# Patient Record
Sex: FEMALE | Race: WHITE | Employment: OTHER | URBAN - METROPOLITAN AREA
[De-identification: names, ages, dates, MRNs, and addresses within clinical notes are randomized per-mention and may not be internally consistent; named-entity substitution may affect disease eponyms.]

---

## 2019-04-12 ENCOUNTER — HOSPITAL ENCOUNTER (OUTPATIENT)
Dept: MAMMOGRAPHY | Age: 64
Discharge: HOME | End: 2019-04-12
Attending: FAMILY MEDICINE
Payer: COMMERCIAL

## 2019-04-12 DIAGNOSIS — Z12.31 VISIT FOR SCREENING MAMMOGRAM: ICD-10-CM

## 2019-04-12 PROCEDURE — 77063 BREAST TOMOSYNTHESIS BI: CPT

## 2020-01-02 ENCOUNTER — OFFICE VISIT (OUTPATIENT)
Dept: CARDIOLOGY CLINIC | Age: 65
End: 2020-01-02

## 2020-01-02 VITALS
OXYGEN SATURATION: 98 % | WEIGHT: 169 LBS | HEART RATE: 65 BPM | DIASTOLIC BLOOD PRESSURE: 102 MMHG | HEIGHT: 67 IN | SYSTOLIC BLOOD PRESSURE: 198 MMHG | BODY MASS INDEX: 26.53 KG/M2

## 2020-01-02 DIAGNOSIS — Z01.810 PREOP CARDIOVASCULAR EXAM: ICD-10-CM

## 2020-01-02 DIAGNOSIS — E03.9 HYPOTHYROIDISM, UNSPECIFIED TYPE: ICD-10-CM

## 2020-01-02 DIAGNOSIS — I10 ESSENTIAL HYPERTENSION: ICD-10-CM

## 2020-01-02 DIAGNOSIS — R94.31 ABNORMAL EKG: Primary | ICD-10-CM

## 2020-01-02 DIAGNOSIS — S62.109K: ICD-10-CM

## 2020-01-02 RX ORDER — ATORVASTATIN CALCIUM 20 MG/1
TABLET, FILM COATED ORAL DAILY
COMMUNITY

## 2020-01-02 RX ORDER — LEVOTHYROXINE SODIUM 88 UG/1
88 TABLET ORAL
COMMUNITY

## 2020-01-02 NOTE — PROGRESS NOTES
HPI: This is a 65-year old female here for a preoperative evaluation for right wrist surgery by Dr. Nicole Bonilla at [facility] scheduled for 1/3/20. In preoperative evaluation, she was found to have an abnormal EKG. She had a stress test a few years ago at [facility] which was unremarkable. She denies absolutely no chest pain, dyspnea, PND, orthopnea, or edema. She was told last year that she may have some early prehypertension but has not required any medication. She does take a statin. She has a family history of coronary artery disease. She also has a history of a thyroid disorder which has been stable. She does not use excessive salt. Impression/Plan:  1. Preoperative cardiovascular exam for right wrist surgery by Dr. Nicole Bonilla at [facility]. I see no restrictions in proceeding. She has excellent functional status, able to climb a couple of flights of stairs without limitations. 2. History of cardiac stress test 2016, unremarkable with normal EF. 3. Abnormal EKG with interventricular conduction delay, new from a few years ago. However, no symptoms of conduction disease or angina. 4. Dyslipidemia on statin. 5. Thyroid disorder on replacement. 6. Family history of coronary artery disease. 7. Suspected hypertension although likely worse in the setting of pain. It is reasonable to proceed with surgery without any further testing. Her stress test a few years ago was normal with no ischemia and normal left ventricular function. She is able to exercise without limitations and given the significant pain, her blood pressure is likely elevated. I have asked her to monitor her blood pressure post procedure and if it remains elevated, let myself or Dr. Rubina Montgomery know so she can be started on appropriate antihypertensives if needed. She does have a blood pressure cuff at home.  I also recommended future long-term echocardiograms since it does appear she has an element of hypertension to make sure she is not having any significant LVH. All questions answered. Current Outpatient Medications   Medication Sig Dispense Refill    atorvastatin (LIPITOR) 20 mg tablet Take  by mouth daily.  levothyroxine (SYNTHROID) 88 mcg tablet Take 88 mcg by mouth Daily (before breakfast). Social History   has no history on file for tobacco.   has no history on file for alcohol. Family History  family history is not on file. Review of Systems  Except as stated above include:  Constitutional: Negative for fever, chills and malaise/fatigue. HEENT: No congestion or recent URI. Gastrointestinal: No nausea, vomiting, abdominal pain, bloody stools. Pulmonary:  Negative except as stated above. Cardiac:  Negative except as stated above. Musculoskeletal: Negative except as stated above. Neurological:  No localized symptoms. Skin:  Negative except as stated above. Psych:  Negative except as stated above. Endocrine:  Negative except as stated above. PHYSICAL EXAM  BP Readings from Last 3 Encounters:   01/02/20 (!) 198/102     Pulse Readings from Last 3 Encounters:   01/02/20 65     Wt Readings from Last 3 Encounters:   01/02/20 76.7 kg (169 lb)     General:   Well developed, well groomed. Head/Neck:   No jugular venous distention     No carotid bruits. No evidence of xanthelasma. Lungs:   No respiratory distress. Clear bilaterally. Heart:    Regular rate and rhythm. Normal S1/S2. Palpation of heart with normal point of maximum impulse. No significant murmurs, rubs or gallops. Abdomen:   Soft and nontender. Extremities:   Intact peripheral pulses. Right arm in cast.  No LE edema. Neurological:   Alert and oriented to person, place, time. No focal neurological deficit visually. Skin:   No obvious rash    Blood Pressure Metric:  Monitor recommended and adjustments stated if needed.

## 2020-01-02 NOTE — LETTER
1/2/2020 2:10 PM 
 
Ms. Sung Hernandez [address] [address] Sung Hernandez was seen in our office on 1/2/2020 for cardiac evaluation. From a cardiac standpoint she cleared for wrist surgery with Dr. Rose Marie Anderson. Please feel free to contact our office if you have any questions regarding this patient. Sincerely,       Jerardo Hargrove MD

## 2020-01-02 NOTE — PROGRESS NOTES
Gretchen Trujillo presents today for   Chief Complaint   Patient presents with    New Patient     self referred for abn ekg - having wrist surgery w/ Dr. Sharonda Pink at [address] preferred language for health care discussion is english/other. Is someone accompanying this pt?      Is the patient using any DME equipment during [address]? no    Depression Screening:  3 most recent PHQ Screens 1/2/2020   Little interest or pleasure in doing things Not at all   Feeling down, depressed, irritable, or hopeless Not at all   Total Score PHQ 2 0       Learning Assessment:  Learning Assessment 1/2/2020   PRIMARY LEARNER Patient   PRIMARY LANGUAGE ENGLISH   LEARNER PREFERENCE PRIMARY DEMONSTRATION   ANSWERED BY Patient   RELATIONSHIP SELF       Abuse Screening:  Abuse Screening Questionnaire 1/2/2020   Do you ever feel afraid of your partner? N   Are you in a relationship with someone who physically or mentally threatens you? N   Is it safe for you to go home? Y       Fall Risk  Fall Risk Assessment, last 12 mths 1/2/2020   Able to walk? Yes   Fall in past 12 months? Yes   Fall with injury? Yes   Number of falls in past 12 months 1   Fall Risk Score 2       Pt currently taking Anticoagulant therapy? no    Coordination of Care:  1. Have you been to the ER, urgent care clinic since your last visit? Hospitalized since your last visit? no    2. Have you seen or consulted any other health care providers outside of the [address] [place] [address] since your last visit? Include any pap smears or colon screening.  no

## 2020-01-02 NOTE — PATIENT INSTRUCTIONS
If you have not heard from the central scheduler to schedule your testing in 48 hours, please call [phone]. PLEASE DO NOT DELAY SURGERY, ECHO MAY BE SCHEDULED FOR A DATE AFTER SURGERY.

## 2020-02-17 ENCOUNTER — HOSPITAL ENCOUNTER (OUTPATIENT)
Dept: BONE DENSITY | Age: 65
Discharge: HOME | End: 2020-02-17
Attending: FAMILY MEDICINE
Payer: COMMERCIAL

## 2020-02-17 DIAGNOSIS — M85.80 OSTEOPENIA: ICD-10-CM

## 2020-02-17 PROCEDURE — 77080 DXA BONE DENSITY AXIAL: CPT

## 2020-05-28 ENCOUNTER — HOSPITAL ENCOUNTER (OUTPATIENT)
Dept: NON INVASIVE DIAGNOSTICS | Age: 65
Discharge: HOME | End: 2020-05-28
Attending: INTERNAL MEDICINE
Payer: COMMERCIAL

## 2020-05-28 VITALS
HEIGHT: 67 IN | WEIGHT: 169 LBS | BODY MASS INDEX: 26.53 KG/M2 | DIASTOLIC BLOOD PRESSURE: 90 MMHG | SYSTOLIC BLOOD PRESSURE: 138 MMHG

## 2020-05-28 DIAGNOSIS — R94.31 ABNORMAL EKG: ICD-10-CM

## 2020-05-28 LAB
ECHO AO ROOT DIAM: 3.06 CM
ECHO LA AREA 4C: 16.6 CM2
ECHO LA VOL 2C: 36.48 ML (ref 22–52)
ECHO LA VOL 4C: 42.56 ML (ref 22–52)
ECHO LA VOL BP: 44.94 ML (ref 22–52)
ECHO LA VOL/BSA BIPLANE: 23.87 ML/M2 (ref 16–28)
ECHO LA VOLUME INDEX A2C: 19.38 ML/M2 (ref 16–28)
ECHO LA VOLUME INDEX A4C: 22.61 ML/M2 (ref 16–28)
ECHO LV E' LATERAL VELOCITY: 6.54 CM/S
ECHO LV E' SEPTAL VELOCITY: 6.18 CM/S
ECHO LV EDV TEICHHOLZ: 0.64 ML
ECHO LV ESV TEICHHOLZ: 0.24 ML
ECHO LV INTERNAL DIMENSION DIASTOLIC: 4.83 CM (ref 3.9–5.3)
ECHO LV INTERNAL DIMENSION SYSTOLIC: 3.2 CM
ECHO LV IVSD: 1.1 CM (ref 0.6–0.9)
ECHO LV MASS 2D: 203.9 G (ref 67–162)
ECHO LV MASS INDEX 2D: 108.3 G/M2 (ref 43–95)
ECHO LV POSTERIOR WALL DIASTOLIC: 0.93 CM (ref 0.6–0.9)
ECHO LVOT DIAM: 2.05 CM
ECHO LVOT PEAK GRADIENT: 5.4 MMHG
ECHO LVOT PEAK VELOCITY: 115.97 CM/S
ECHO LVOT VTI: 27.87 CM
ECHO MV A VELOCITY: 71.31 CM/S
ECHO MV E DECELERATION TIME (DT): 205.8 MS
ECHO MV E VELOCITY: 61.65 CM/S
ECHO MV E/A RATIO: 0.86
ECHO MV E/E' LATERAL: 9.43
ECHO MV E/E' RATIO (AVERAGED): 9.7
ECHO MV E/E' SEPTAL: 9.98
ECHO RV TAPSE: 2.51 CM (ref 1.5–2)
ECHO TV REGURGITANT MAX VELOCITY: 243.22 CM/S
ECHO TV REGURGITANT PEAK GRADIENT: 23.7 MMHG
LVFS 2D: 33.78 %
LVOT MG: 2.24 MMHG
LVOT MV: 0.67 CM/S
LVSV (TEICH): 35.78 ML
MV DEC SLOPE: 3

## 2020-05-28 PROCEDURE — 93306 TTE W/DOPPLER COMPLETE: CPT

## 2020-11-25 ENCOUNTER — OFFICE VISIT (OUTPATIENT)
Dept: CARDIOLOGY CLINIC | Age: 65
End: 2020-11-25
Payer: COMMERCIAL

## 2020-11-25 VITALS
DIASTOLIC BLOOD PRESSURE: 98 MMHG | SYSTOLIC BLOOD PRESSURE: 168 MMHG | HEART RATE: 67 BPM | BODY MASS INDEX: 25.9 KG/M2 | WEIGHT: 165 LBS | HEIGHT: 67 IN | OXYGEN SATURATION: 100 %

## 2020-11-25 DIAGNOSIS — E03.9 HYPOTHYROIDISM, UNSPECIFIED TYPE: ICD-10-CM

## 2020-11-25 DIAGNOSIS — I10 ESSENTIAL HYPERTENSION: ICD-10-CM

## 2020-11-25 DIAGNOSIS — R94.31 ABNORMAL EKG: Primary | ICD-10-CM

## 2020-11-25 PROCEDURE — 93000 ELECTROCARDIOGRAM COMPLETE: CPT | Performed by: INTERNAL MEDICINE

## 2020-11-25 PROCEDURE — 99215 OFFICE O/P EST HI 40 MIN: CPT | Performed by: INTERNAL MEDICINE

## 2020-11-25 RX ORDER — AMLODIPINE BESYLATE 5 MG/1
5 TABLET ORAL DAILY
Qty: 90 TAB | Refills: 3 | Status: SHIPPED | OUTPATIENT
Start: 2020-11-25 | End: 2022-01-24

## 2020-11-25 RX ORDER — LISINOPRIL 40 MG/1
40 TABLET ORAL DAILY
COMMUNITY

## 2020-11-25 NOTE — PROGRESS NOTES
Antony Staton presents today for   Chief Complaint   Patient presents with    Follow-up     10 month follow up       Antony Staton preferred language for health care discussion is english/other. Is someone accompanying this pt? no    Is the patient using any DME equipment during [address]? no    Depression Screening:  3 most recent PHQ Screens 11/25/2020   Little interest or pleasure in doing things Not at all   Feeling down, depressed, irritable, or hopeless Not at all   Total Score PHQ 2 0       Learning Assessment:  Learning Assessment 11/25/2020   PRIMARY LEARNER Patient   PRIMARY LANGUAGE ENGLISH   LEARNER PREFERENCE PRIMARY DEMONSTRATION   ANSWERED BY patient   RELATIONSHIP SELF       Abuse Screening:  Abuse Screening Questionnaire 11/25/2020   Do you ever feel afraid of your partner? N   Are you in a relationship with someone who physically or mentally threatens you? N   Is it safe for you to go home? Y       Fall Risk  Fall Risk Assessment, last 12 mths 11/25/2020   Able to walk? Yes   Fall in past 12 months? No   Fall with injury? -   Number of falls in past 12 months -   Fall Risk Score -       Pt currently taking Anticoagulant therapy? no    Coordination of Care:  1. Have you been to the ER, urgent care clinic since your last visit? Hospitalized since your last visit? no    2. Have you seen or consulted any other health care providers outside of the [address] [place] [address] since your last visit? Include any pap smears or colon screening.  no  '

## 2020-11-25 NOTE — PROGRESS NOTES
History of Present Illness:  60year old female here for follow up. I saw her in January, 2020 for preop evaluation for planned wrist surgery that occurred at [facility] 01/03/20. It was a preop evaluation. Her blood pressure had been up to 200 mmHg. She has been doing well since then. Denies any new chest pain, dyspnea, PND, orthopnea or edema. Systolic blood pressure has been up to about 140 mmHg consistently in the morning. She had been started on Lisinopril 40 mg earlier this year. She tries to limit salt intake as best she can. She takes a statin. There is a strong family history of coronary artery disease. Her thyroid was checked in the spring and was normal.    Impression:  1. Abnormal EKG with intraventricular conduction delay, slightly worsened from last year and a few years before that, now nearly a left bundle branch block. 2. History of cardiac stress test 2016 unremarkable, normal EF. 3. History of echo May, 2020 with normal EF. 4. Essential hypertension with still elevated blood pressure, consistently about 140 mmHg at home. 5. Thyroid disorder, on replacement, recently checked in the spring, normal by report. 6. Dyslipidemia, on statin. 7. Strong family history of CAD. Plan:  She is asymptomatic with her abnormal EKG, as well as increased blood pressure. This suggests she has likely had some longstanding hypertension through the years. I am going to start her on Norvasc 5 mg daily in addition to the ongoing Lisinopril. I will plan to see her back in about three months. All questions answered. Past Medical History:   Diagnosis Date    Essential hypertension     Hyperlipidemia     Menopause     Thyroid disease        Current Outpatient Medications   Medication Sig Dispense Refill    lisinopriL (PRINIVIL, ZESTRIL) 40 mg tablet Take 40 mg by mouth daily.  atorvastatin (LIPITOR) 20 mg tablet Take  by mouth daily.       levothyroxine (SYNTHROID) 88 mcg tablet Take 88 mcg by mouth Daily (before breakfast). Social History   reports that she has never smoked. She has never used smokeless tobacco.   reports current alcohol use. Family History  family history includes Diabetes in her mother; Emphysema in her father; Heart Attack in her mother; Heart Disease in her father and mother. Review of Systems  Except as stated above include:  Constitutional: Negative for fever, chills and malaise/fatigue. HEENT: No congestion or recent URI. Gastrointestinal: No nausea, vomiting, abdominal pain, bloody stools. Pulmonary:  Negative except as stated above. Cardiac:  Negative except as stated above. Musculoskeletal: Negative except as stated above. Neurological:  No localized symptoms. Skin:  Negative except as stated above. Psych:  Negative except as stated above. Endocrine:  Negative except as stated above. PHYSICAL EXAM  BP Readings from Last 3 Encounters:   11/25/20 (!) 168/98   05/28/20 138/90   01/02/20 (!) 198/102     Pulse Readings from Last 3 Encounters:   11/25/20 67   01/02/20 65     Wt Readings from Last 3 Encounters:   11/25/20 74.8 kg (165 lb)   05/28/20 76.7 kg (169 lb)   01/02/20 76.7 kg (169 lb)     General:   Well developed, well groomed. Head/Neck:   No obvious jugular venous distention     No obvious carotid pulsations. No evidence of xanthelasma. Lungs:   No respiratory distress. Clear bilaterally. Heart:  Regular rate and rhythm. Normal S1/S2. Palpation grossly normal.    No significant murmurs, rubs or gallops. Abdomen:   Non-acute abdomen. No obvious pulsations. Extremities:   Intact peripheral pulses. No significant edema. Neurological:   Alert and oriented to person, place, time. No focal neurological deficit visually. Skin:   No obvious rash    Blood Pressure Metric:  Monitor recommended and adjustments stated if needed.

## 2021-10-13 ENCOUNTER — OFFICE VISIT (OUTPATIENT)
Dept: CARDIOLOGY CLINIC | Age: 66
End: 2021-10-13
Payer: COMMERCIAL

## 2021-10-13 VITALS
OXYGEN SATURATION: 98 % | BODY MASS INDEX: 26.94 KG/M2 | RESPIRATION RATE: 20 BRPM | WEIGHT: 172 LBS | HEART RATE: 63 BPM | DIASTOLIC BLOOD PRESSURE: 88 MMHG | SYSTOLIC BLOOD PRESSURE: 138 MMHG

## 2021-10-13 DIAGNOSIS — E03.9 HYPOTHYROIDISM, UNSPECIFIED TYPE: ICD-10-CM

## 2021-10-13 DIAGNOSIS — R94.31 ABNORMAL EKG: ICD-10-CM

## 2021-10-13 DIAGNOSIS — I44.7 LBBB (LEFT BUNDLE BRANCH BLOCK): Primary | ICD-10-CM

## 2021-10-13 DIAGNOSIS — R09.89 BRUIT: ICD-10-CM

## 2021-10-13 DIAGNOSIS — R42 DIZZY: ICD-10-CM

## 2021-10-13 DIAGNOSIS — I10 ESSENTIAL HYPERTENSION: ICD-10-CM

## 2021-10-13 PROCEDURE — 99214 OFFICE O/P EST MOD 30 MIN: CPT | Performed by: INTERNAL MEDICINE

## 2021-10-13 PROCEDURE — 93000 ELECTROCARDIOGRAM COMPLETE: CPT | Performed by: INTERNAL MEDICINE

## 2021-10-13 NOTE — PROGRESS NOTES
History of Present Illness:  73year old female here for follow up. She has noticed the past few months she will exercise and walk and start to feel a little dizzy at times, although no loss of consciousness. No significant chest pain. She does have generalized fatigue with minimal dyspnea. She has just gained a few pounds as a result of being sedentary. She keeps a home blood pressure logbook, 110-120. At times when she feels these symptoms heart rate does not change significantly. Impression:  1. Exertional dizziness with borderline dyspnea, increasing fatigue, unclear etiology. 2. LBBB by EKG. 3. Last stress test 2016, unremarkable, normal EF. 4. Echo May, 2020 with normal function. 5. Essential HTN, improved with home values well controlled. 6. Thyroid disorder, on replacement, not checked since last spring. 7. Dyslipidemia, on statin. 6. Strong family hx of CAD. Plan:  Given her exertional dizziness, fatigue and potential for abnormal anginal equivalent, along with abnormal EKG, I have recommended an exercise nuclear stress test, carotid ultrasound and blood work, including TSH. If this is unremarkable, I will see back in a year. Past Medical History:   Diagnosis Date    Essential hypertension     Hyperlipidemia     Menopause     Thyroid disease        Current Outpatient Medications   Medication Sig Dispense Refill    lisinopriL (PRINIVIL, ZESTRIL) 40 mg tablet Take 40 mg by mouth daily.  amLODIPine (NORVASC) 5 mg tablet Take 1 Tab by mouth daily. 90 Tab 3    atorvastatin (LIPITOR) 20 mg tablet Take  by mouth daily.  levothyroxine (SYNTHROID) 88 mcg tablet Take 88 mcg by mouth Daily (before breakfast). Social History   reports that she has never smoked. She has never used smokeless tobacco.   reports current alcohol use.     Family History  family history includes Diabetes in her mother; Emphysema in her father; Heart Attack in her mother; Heart Disease in her father and mother. Review of Systems  Except as stated above include:  Constitutional: Negative for fever, chills and malaise/fatigue. HEENT: No congestion or recent URI. Gastrointestinal: No nausea, vomiting, abdominal pain, bloody stools. Pulmonary:  Negative except as stated above. Cardiac:  Negative except as stated above. Musculoskeletal: Negative except as stated above. Neurological:  No localized symptoms. Skin:  Negative except as stated above. Psych:  Negative except as stated above. Endocrine:  Negative except as stated above. PHYSICAL EXAM  BP Readings from Last 3 Encounters:   11/25/20 (!) 168/98   05/28/20 138/90   01/02/20 (!) 198/102     Pulse Readings from Last 3 Encounters:   11/25/20 67   01/02/20 65     Wt Readings from Last 3 Encounters:   11/25/20 74.8 kg (165 lb)   05/28/20 76.7 kg (169 lb)   01/02/20 76.7 kg (169 lb)     General:   Well developed, well groomed. Head/Neck:   No obvious jugular venous distention     No obvious carotid pulsations. No evidence of xanthelasma. Lungs:   No respiratory distress. Clear bilaterally. Heart:  Regular rate and rhythm. Normal S1/S2. Palpation grossly normal.    No significant murmurs, rubs or gallops. Abdomen:   Non-acute abdomen. No obvious pulsations. Extremities:   Intact peripheral pulses. No significant edema. Neurological:   Alert and oriented to person, place, time. No focal neurological deficit visually. Skin:   No obvious rash    Blood Pressure Metric:  Monitor recommended and adjustments stated if needed.

## 2021-10-13 NOTE — PATIENT INSTRUCTIONS
If you have not heard from the central scheduler to schedule your testing in 48 hours, please call [phone].

## 2022-01-24 RX ORDER — AMLODIPINE BESYLATE 5 MG/1
TABLET ORAL
Qty: 90 TABLET | Refills: 3 | Status: SHIPPED | OUTPATIENT
Start: 2022-01-24 | End: 2022-10-12

## 2022-04-06 ENCOUNTER — TRANSCRIBE ORDER (OUTPATIENT)
Dept: SCHEDULING | Age: 67
End: 2022-04-06

## 2022-04-06 DIAGNOSIS — M85.80 OSTEOPENIA: Primary | ICD-10-CM

## 2022-04-07 ENCOUNTER — TRANSCRIBE ORDER (OUTPATIENT)
Dept: SCHEDULING | Age: 67
End: 2022-04-07

## 2022-04-07 DIAGNOSIS — Z12.31 VISIT FOR SCREENING MAMMOGRAM: Primary | ICD-10-CM

## 2022-04-22 ENCOUNTER — HOSPITAL ENCOUNTER (OUTPATIENT)
Dept: BONE DENSITY | Age: 67
Discharge: HOME | End: 2022-04-22
Attending: FAMILY MEDICINE
Payer: COMMERCIAL

## 2022-04-22 ENCOUNTER — HOSPITAL ENCOUNTER (OUTPATIENT)
Dept: MAMMOGRAPHY | Age: 67
Discharge: HOME | End: 2022-04-22
Attending: FAMILY MEDICINE
Payer: COMMERCIAL

## 2022-04-22 DIAGNOSIS — M85.80 OSTEOPENIA: ICD-10-CM

## 2022-04-22 DIAGNOSIS — Z12.31 VISIT FOR SCREENING MAMMOGRAM: ICD-10-CM

## 2022-04-22 PROCEDURE — 77080 DXA BONE DENSITY AXIAL: CPT

## 2022-04-22 PROCEDURE — 77063 BREAST TOMOSYNTHESIS BI: CPT

## 2022-04-26 ENCOUNTER — TRANSCRIBE ORDER (OUTPATIENT)
Dept: SCHEDULING | Age: 67
End: 2022-04-26

## 2022-04-26 DIAGNOSIS — R92.8 ABNORMAL MAMMOGRAM: Primary | ICD-10-CM

## 2022-04-27 ENCOUNTER — TRANSCRIBE ORDER (OUTPATIENT)
Dept: SCHEDULING | Age: 67
End: 2022-04-27

## 2022-04-28 DIAGNOSIS — I10 ESSENTIAL HYPERTENSION: ICD-10-CM

## 2022-04-28 DIAGNOSIS — R94.31 ABNORMAL EKG: Primary | ICD-10-CM

## 2022-04-28 DIAGNOSIS — R42 DIZZY: ICD-10-CM

## 2022-05-04 ENCOUNTER — HOSPITAL ENCOUNTER (OUTPATIENT)
Dept: MAMMOGRAPHY | Age: 67
Discharge: HOME | End: 2022-05-04
Attending: FAMILY MEDICINE
Payer: COMMERCIAL

## 2022-05-04 ENCOUNTER — HOSPITAL ENCOUNTER (OUTPATIENT)
Dept: ULTRASOUND IMAGING | Age: 67
Discharge: HOME | End: 2022-05-04
Attending: FAMILY MEDICINE
Payer: COMMERCIAL

## 2022-05-04 DIAGNOSIS — R92.8 ABNORMAL MAMMOGRAM: ICD-10-CM

## 2022-05-04 PROCEDURE — 77061 BREAST TOMOSYNTHESIS UNI: CPT

## 2022-05-04 PROCEDURE — 76642 ULTRASOUND BREAST LIMITED: CPT

## 2022-05-25 ENCOUNTER — HOSPITAL ENCOUNTER (OUTPATIENT)
Dept: MAMMOGRAPHY | Age: 67
Discharge: HOME | End: 2022-05-25
Attending: FAMILY MEDICINE
Payer: COMMERCIAL

## 2022-05-25 DIAGNOSIS — N63.0 LUMP OR MASS IN BREAST: ICD-10-CM

## 2022-05-25 DIAGNOSIS — R92.8 ABNORMAL MAMMOGRAM: ICD-10-CM

## 2022-05-25 PROCEDURE — 77061 BREAST TOMOSYNTHESIS UNI: CPT

## 2022-06-01 ENCOUNTER — TELEPHONE (OUTPATIENT)
Dept: CARDIOLOGY CLINIC | Age: 67
End: 2022-06-01

## 2022-10-12 ENCOUNTER — OFFICE VISIT (OUTPATIENT)
Dept: CARDIOLOGY CLINIC | Age: 67
End: 2022-10-12
Payer: COMMERCIAL

## 2022-10-12 VITALS
WEIGHT: 168 LBS | OXYGEN SATURATION: 99 % | HEART RATE: 108 BPM | HEIGHT: 67 IN | SYSTOLIC BLOOD PRESSURE: 120 MMHG | BODY MASS INDEX: 26.37 KG/M2 | DIASTOLIC BLOOD PRESSURE: 62 MMHG

## 2022-10-12 DIAGNOSIS — E07.9 THYROID DISORDER: ICD-10-CM

## 2022-10-12 DIAGNOSIS — R94.31 ABNORMAL EKG: ICD-10-CM

## 2022-10-12 DIAGNOSIS — R09.89 BRUIT: ICD-10-CM

## 2022-10-12 DIAGNOSIS — R42 DIZZY: ICD-10-CM

## 2022-10-12 DIAGNOSIS — I10 ESSENTIAL HYPERTENSION: ICD-10-CM

## 2022-10-12 DIAGNOSIS — I44.7 LBBB (LEFT BUNDLE BRANCH BLOCK): Primary | ICD-10-CM

## 2022-10-12 PROCEDURE — 1123F ACP DISCUSS/DSCN MKR DOCD: CPT | Performed by: INTERNAL MEDICINE

## 2022-10-12 PROCEDURE — 99214 OFFICE O/P EST MOD 30 MIN: CPT | Performed by: INTERNAL MEDICINE

## 2022-10-12 RX ORDER — AMLODIPINE BESYLATE 2.5 MG/1
2.5 TABLET ORAL DAILY
COMMUNITY
Start: 2022-09-15

## 2022-10-12 NOTE — PROGRESS NOTES
History of Present Illness:  78 YOF here for follow up. Overall she is doing well. No new chest pain, dyspnea, PND, orthopnea or edema. She had the stress test done in June, which was reviewed. Home blood pressure is well controlled. No significant limitation in activity, weight is stable. Impression:  History of occasional dyspnea and fatigue with underlying LBBB and exercise nuclear stress test June 2022 without obvious ischemia, normal EF. History of LBBB appearing to be rate dependent. Echo May 2020 with normal function. HTN, well controlled, following with primary physician. Thyroid disorder, on replacement. Dyslipidemia, on statin. Strong family history of CAD. Plan:  She is doing quite well. We talked about risk factor control given her strong family history of coronary artery disease, including blood pressure and cholesterol treatment. She remains active without limitations. She did have a scare earlier this year with a breast mass, but it resolved on its own, not requiring intervention. All questions answered and I will see back in one year.

## 2022-10-12 NOTE — PROGRESS NOTES
Isael Rascon presents today for   Chief Complaint   Patient presents with    Follow-up     1 year        Isael Rascon preferred language for health care discussion is english/other. Is someone accompanying this pt? no    Is the patient using any DME equipment during [address]? no    Depression Screening:  3 most recent PHQ Screens 10/13/2021   Little interest or pleasure in doing things Not at all   Feeling down, depressed, irritable, or hopeless Not at all   Total Score PHQ 2 0       Learning Assessment:  Learning Assessment 11/25/2020   PRIMARY LEARNER Patient   PRIMARY LANGUAGE ENGLISH   LEARNER PREFERENCE PRIMARY DEMONSTRATION   ANSWERED BY patient   RELATIONSHIP SELF       Abuse Screening:  Abuse Screening Questionnaire 11/25/2020   Do you ever feel afraid of your partner? N   Are you in a relationship with someone who physically or mentally threatens you? N   Is it safe for you to go home? Y       Fall Risk  Fall Risk Assessment, last 12 mths 10/13/2021   Able to walk? Yes   Fall in past 12 months? 0   Do you feel unsteady? 0   Are you worried about falling 0   Number of falls in past 12 months -   Fall with injury? -       Pt currently taking Anticoagulant therapy? no    Coordination of Care:  1. Have you been to the ER, urgent care clinic since your last visit? Hospitalized since your last visit? no    2. Have you seen or consulted any other health care providers outside of the [address] [place] [address] since your last visit? Include any pap smears or colon screening.  no

## 2022-10-12 NOTE — PROGRESS NOTES
Dictation on: 10/12/2022  8:18 AM by: Adelaide Hurley       Past Medical History:   Diagnosis Date    Essential hypertension     Hyperlipidemia     Menopause     Thyroid disease        Current Outpatient Medications   Medication Sig Dispense Refill    amLODIPine (NORVASC) 2.5 mg tablet Take 2.5 mg by mouth daily. lisinopriL (PRINIVIL, ZESTRIL) 40 mg tablet Take 40 mg by mouth daily. atorvastatin (LIPITOR) 20 mg tablet Take  by mouth daily. levothyroxine (SYNTHROID) 88 mcg tablet Take 88 mcg by mouth Daily (before breakfast). Social History   reports that she has never smoked. She has never used smokeless tobacco.   reports current alcohol use. Family History  family history includes Diabetes in her mother; Emphysema in her father; Heart Attack in her mother; Heart Disease in her father and mother. Review of Systems  Except as stated above include:  Constitutional: Negative for fever, chills and malaise/fatigue. HEENT: No congestion or recent URI. Gastrointestinal: No nausea, vomiting, abdominal pain, bloody stools. Pulmonary:  Negative except as stated above. Cardiac:  Negative except as stated above. Musculoskeletal: Negative except as stated above. Neurological:  No localized symptoms. Skin:  Negative except as stated above. Psych:  Negative except as stated above. Endocrine:  Negative except as stated above. PHYSICAL EXAM  BP Readings from Last 3 Encounters:   10/12/22 120/62   06/02/22 (!) 140/80   10/13/21 138/88     Pulse Readings from Last 3 Encounters:   10/12/22 (!) 108   10/13/21 63   11/25/20 67     Wt Readings from Last 3 Encounters:   10/12/22 76.2 kg (168 lb)   06/02/22 78 kg (172 lb)   10/13/21 78 kg (172 lb)     General:   Well developed, well groomed. Head/Neck:   No obvious jugular venous distention     No obvious carotid pulsations. No evidence of xanthelasma. Lungs:   No respiratory distress. Clear bilaterally.   Heart:  Regular rate and rhythm. Normal S1/S2. Palpation grossly normal.    No significant murmurs, rubs or gallops. Abdomen:   Non-acute abdomen. No obvious pulsations. Extremities:   Intact peripheral pulses. No significant edema. Neurological:   Alert and oriented to person, place, time. No focal neurological deficit visually. Skin:   No obvious rash    Blood Pressure Metric:  Monitor recommended and adjustments stated if needed.

## 2023-01-31 DIAGNOSIS — R92.8 ABNORMAL MAMMOGRAM: Primary | ICD-10-CM

## 2023-02-01 DIAGNOSIS — R92.8 ABNORMAL MAMMOGRAM: Primary | ICD-10-CM

## 2023-02-03 DIAGNOSIS — R92.8 ABNORMAL MAMMOGRAM: Primary | ICD-10-CM

## 2023-02-04 DIAGNOSIS — R92.8 ABNORMAL MAMMOGRAM: Primary | ICD-10-CM

## 2023-02-06 DIAGNOSIS — R92.8 ABNORMAL MAMMOGRAM: Primary | ICD-10-CM

## 2023-02-07 DIAGNOSIS — R92.8 ABNORMAL MAMMOGRAM: Primary | ICD-10-CM

## 2023-10-11 ENCOUNTER — OFFICE VISIT (OUTPATIENT)
Age: 68
End: 2023-10-11
Payer: COMMERCIAL

## 2023-10-11 VITALS
SYSTOLIC BLOOD PRESSURE: 120 MMHG | BODY MASS INDEX: 27.25 KG/M2 | DIASTOLIC BLOOD PRESSURE: 84 MMHG | HEART RATE: 66 BPM | OXYGEN SATURATION: 99 % | WEIGHT: 174 LBS

## 2023-10-11 DIAGNOSIS — E07.9 THYROID DISORDER: ICD-10-CM

## 2023-10-11 DIAGNOSIS — I10 PRIMARY HYPERTENSION: Primary | ICD-10-CM

## 2023-10-11 PROCEDURE — 3079F DIAST BP 80-89 MM HG: CPT | Performed by: INTERNAL MEDICINE

## 2023-10-11 PROCEDURE — 3074F SYST BP LT 130 MM HG: CPT | Performed by: INTERNAL MEDICINE

## 2023-10-11 PROCEDURE — 1123F ACP DISCUSS/DSCN MKR DOCD: CPT | Performed by: INTERNAL MEDICINE

## 2023-10-11 PROCEDURE — 99214 OFFICE O/P EST MOD 30 MIN: CPT | Performed by: INTERNAL MEDICINE

## 2023-10-11 NOTE — PROGRESS NOTES
History of Present Illness:  80 YOF here for follow up. Over the past month she has had labile blood pressure down to 90s at times, 130s-140s. She has felt generally fatigued with this. Her heart rate also went up to 100 bpm.  No new chest pain or syncope. Impression:  Recent episodes of labile blood pressure, as well as increased heart rate, up to about 100 bpm, with fatigue. History of hypertension, previously well controlled. Echocardiogram May 2020 with normal function. Remote LBBB, appearing to be rate dependent. History of exercise nuclear stress test June 2022 without obvious ischemia, normal EF. Dyslipidemia, on statin. Strong family history of CAD. Plan:  She has had labile blood pressure and some recent episodes of increased heart rate and general fatigue at times. I have asked her to decrease Lisinopril from 40 down to 20 mg daily and I talked about using a home monitor to check her heart rate. She will see me back in two months to review. Wt Readings from Last 3 Encounters:   10/11/23 174 lb (78.9 kg)   10/12/22 168 lb (76.2 kg)   06/02/22 172 lb (78 kg)     Past Medical History:   Diagnosis Date    Essential hypertension     Hyperlipidemia     Menopause     Thyroid disease        Current Outpatient Medications   Medication Sig Dispense Refill    amLODIPine (NORVASC) 2.5 MG tablet Take 1 tablet by mouth daily      atorvastatin (LIPITOR) 20 MG tablet Take by mouth daily      levothyroxine (SYNTHROID) 88 MCG tablet Take 1 tablet by mouth every morning (before breakfast)      lisinopril (PRINIVIL;ZESTRIL) 40 MG tablet Take 1 tablet by mouth daily       No current facility-administered medications for this visit. Social History   reports that she has never smoked. She has never used smokeless tobacco.   reports current alcohol use.     Family History  family history includes Diabetes in her mother; Emphysema in her father; Heart Attack in her mother; Heart Disease in her father

## 2023-10-11 NOTE — PATIENT INSTRUCTIONS
Medication Changes      **please allow 24-48 hrs for medication to be escribed to pharmacy** If you need any refills on medications please contact your pharmacy so that the request can be escribed to the provider for review seven to 10 days prior to being out of medication.

## 2023-10-25 ENCOUNTER — TRANSCRIBE ORDERS (OUTPATIENT)
Facility: HOSPITAL | Age: 68
End: 2023-10-25

## 2023-10-25 DIAGNOSIS — Z12.31 VISIT FOR SCREENING MAMMOGRAM: Primary | ICD-10-CM

## 2023-11-13 ENCOUNTER — HOSPITAL ENCOUNTER (OUTPATIENT)
Facility: HOSPITAL | Age: 68
Discharge: HOME | End: 2023-11-16
Payer: COMMERCIAL

## 2023-11-13 VITALS — WEIGHT: 174 LBS | BODY MASS INDEX: 27.31 KG/M2 | HEIGHT: 67 IN

## 2023-11-13 DIAGNOSIS — Z12.31 VISIT FOR SCREENING MAMMOGRAM: ICD-10-CM

## 2023-11-13 PROCEDURE — 77063 BREAST TOMOSYNTHESIS BI: CPT

## 2024-10-30 ENCOUNTER — OFFICE VISIT (OUTPATIENT)
Age: 69
End: 2024-10-30
Payer: COMMERCIAL

## 2024-10-30 VITALS
OXYGEN SATURATION: 97 % | DIASTOLIC BLOOD PRESSURE: 80 MMHG | SYSTOLIC BLOOD PRESSURE: 126 MMHG | WEIGHT: 170 LBS | HEIGHT: 67 IN | HEART RATE: 62 BPM | BODY MASS INDEX: 26.68 KG/M2

## 2024-10-30 DIAGNOSIS — I10 PRIMARY HYPERTENSION: Primary | ICD-10-CM

## 2024-10-30 DIAGNOSIS — I44.7 LBBB (LEFT BUNDLE BRANCH BLOCK): ICD-10-CM

## 2024-10-30 DIAGNOSIS — E78.5 DYSLIPIDEMIA: ICD-10-CM

## 2024-10-30 DIAGNOSIS — Z82.49 FAMILY HISTORY OF EARLY CAD: ICD-10-CM

## 2024-10-30 PROCEDURE — 3079F DIAST BP 80-89 MM HG: CPT | Performed by: INTERNAL MEDICINE

## 2024-10-30 PROCEDURE — 99214 OFFICE O/P EST MOD 30 MIN: CPT | Performed by: INTERNAL MEDICINE

## 2024-10-30 PROCEDURE — 1123F ACP DISCUSS/DSCN MKR DOCD: CPT | Performed by: INTERNAL MEDICINE

## 2024-10-30 PROCEDURE — 3074F SYST BP LT 130 MM HG: CPT | Performed by: INTERNAL MEDICINE

## 2024-10-30 PROCEDURE — 93000 ELECTROCARDIOGRAM COMPLETE: CPT | Performed by: INTERNAL MEDICINE

## 2024-10-30 NOTE — PROGRESS NOTES
40 MG tablet Take 1 tablet by mouth daily       No current facility-administered medications for this visit.       Social History   reports that she has never smoked. She has never used smokeless tobacco.   reports current alcohol use.    Family History  family history includes Diabetes in her mother; Emphysema in her father; Heart Attack in her mother; Heart Disease in her father and mother.    Review of Systems  Except as stated above include:  Constitutional: Negative for fever, chills and malaise/fatigue.   HEENT: No congestion or recent URI.  Gastrointestinal: No nausea, vomiting, abdominal pain, bloody stools.  Pulmonary:  Negative except as stated above.  Cardiac:  Negative except as stated above.  Musculoskeletal: Negative except as stated above.  Neurological:  No localized symptoms.  Endocrine:  Negative except as stated above.    PHYSICAL EXAM  BP Readings from Last 3 Encounters:   10/30/24 126/80   10/11/23 120/84   10/12/22 120/62     Pulse Readings from Last 3 Encounters:   10/30/24 62   10/11/23 66   10/12/22 (!) 108     General:   Well developed, well groomed.    Head/Neck:   No obvious jugular venous distention     No obvious carotid pulsations.      No evidence of xanthelasma.  Lungs:   No respiratory distress.      Clear bilaterally.  Heart:  Regular rate and rhythm.    Abdomen:   Soft and nontender  Extremities:   Intact peripheral pulses.      No significant edema.    Neurological:   Alert and oriented to person, place, time.      No focal neurological deficit visually.  Skin:   No obvious rash

## 2024-11-07 ENCOUNTER — HOSPITAL ENCOUNTER (OUTPATIENT)
Facility: HOSPITAL | Age: 69
Discharge: HOME | End: 2024-11-10
Attending: INTERNAL MEDICINE

## 2024-11-07 DIAGNOSIS — I44.7 LBBB (LEFT BUNDLE BRANCH BLOCK): ICD-10-CM

## 2024-11-07 DIAGNOSIS — Z82.49 FAMILY HISTORY OF EARLY CAD: ICD-10-CM

## 2024-11-07 PROCEDURE — 75571 CT HRT W/O DYE W/CA TEST: CPT

## 2025-02-28 ENCOUNTER — TELEPHONE (OUTPATIENT)
Age: 70
End: 2025-02-28

## 2025-03-06 ENCOUNTER — OFFICE VISIT (OUTPATIENT)
Age: 70
End: 2025-03-06
Payer: COMMERCIAL

## 2025-03-06 VITALS
WEIGHT: 171.4 LBS | HEART RATE: 76 BPM | SYSTOLIC BLOOD PRESSURE: 150 MMHG | BODY MASS INDEX: 26.9 KG/M2 | DIASTOLIC BLOOD PRESSURE: 86 MMHG | HEIGHT: 67 IN | OXYGEN SATURATION: 93 %

## 2025-03-06 DIAGNOSIS — I10 PRIMARY HYPERTENSION: Primary | ICD-10-CM

## 2025-03-06 DIAGNOSIS — E78.5 DYSLIPIDEMIA: ICD-10-CM

## 2025-03-06 DIAGNOSIS — R00.2 PALPITATIONS: ICD-10-CM

## 2025-03-06 PROCEDURE — 3077F SYST BP >= 140 MM HG: CPT | Performed by: INTERNAL MEDICINE

## 2025-03-06 PROCEDURE — 1123F ACP DISCUSS/DSCN MKR DOCD: CPT | Performed by: INTERNAL MEDICINE

## 2025-03-06 PROCEDURE — 3079F DIAST BP 80-89 MM HG: CPT | Performed by: INTERNAL MEDICINE

## 2025-03-06 PROCEDURE — 99214 OFFICE O/P EST MOD 30 MIN: CPT | Performed by: INTERNAL MEDICINE

## 2025-03-06 RX ORDER — LISINOPRIL 20 MG/1
20 TABLET ORAL 2 TIMES DAILY
Qty: 180 TABLET | Refills: 2 | Status: SHIPPED | OUTPATIENT
Start: 2025-03-06

## 2025-03-06 NOTE — PROGRESS NOTES
History of Present Illness:  69 year-old female here for followup.  She has been checking her home blood pressure, as well as pulse with the new system from her 's work and it is showing intermittent irregular heartbeat.  She has intermittent palpitations as well, but no significant chest pain.  Some general fatigue at times, but she has not been as active with the weather.  No syncope, PND, orthopnea or edema.  Her thyroid has not been checked for a while.  Her blood pressure was labile, at times it is higher in the morning, but then it will start to spike in the evening.      Impression:   History of hypertension, quite labile, on Lisinopril 40 mg usually in the morning.  I recommended we switch it to 20 mg twice a day.    Palpitations with an irregular heartbeat by her home monitor, although it does not give tracings.  I have recommended a home event monitor to evaluate her rhythm for a couple of weeks.    History of rate dependent left bundle branch block.    Thyroid disorder on replacement.    History of dyslipidemia intolerant to statin.    Strong family history of CAD.    Echocardiogram 2020 with normal function.    CT coronary calcium score last year of zero.      Plan:  I discussed her CT calcium score of zero last year.  Her blood pressure is quite labile.  We will switch to Lisinopril 20 mg twice daily and I am going to have her wear an event monitor, as well as check thyroid levels.  She is going to keep a logbook of sodium intake and try to keep it to 1500 mg or less a day.  All questions were answered.  I will see her back after testing.        Wt Readings from Last 3 Encounters:   03/06/25 77.7 kg (171 lb 6.4 oz)   10/30/24 77.1 kg (170 lb)   11/13/23 78.9 kg (174 lb)     Past Medical History:   Diagnosis Date    Essential hypertension     Hyperlipidemia     Menopause     Thyroid disease        Current Outpatient Medications   Medication Sig Dispense Refill    levothyroxine (SYNTHROID) 88 MCG 
ear

## 2025-04-16 ENCOUNTER — OFFICE VISIT (OUTPATIENT)
Age: 70
End: 2025-04-16
Payer: COMMERCIAL

## 2025-04-16 VITALS
BODY MASS INDEX: 26.68 KG/M2 | DIASTOLIC BLOOD PRESSURE: 70 MMHG | HEIGHT: 67 IN | OXYGEN SATURATION: 99 % | WEIGHT: 170 LBS | SYSTOLIC BLOOD PRESSURE: 122 MMHG | HEART RATE: 71 BPM

## 2025-04-16 DIAGNOSIS — R00.2 PALPITATION: ICD-10-CM

## 2025-04-16 DIAGNOSIS — I10 PRIMARY HYPERTENSION: Primary | ICD-10-CM

## 2025-04-16 DIAGNOSIS — I47.19 ATRIAL TACHYCARDIA: ICD-10-CM

## 2025-04-16 PROCEDURE — 3078F DIAST BP <80 MM HG: CPT | Performed by: INTERNAL MEDICINE

## 2025-04-16 PROCEDURE — 1123F ACP DISCUSS/DSCN MKR DOCD: CPT | Performed by: INTERNAL MEDICINE

## 2025-04-16 PROCEDURE — 3074F SYST BP LT 130 MM HG: CPT | Performed by: INTERNAL MEDICINE

## 2025-04-16 PROCEDURE — 99214 OFFICE O/P EST MOD 30 MIN: CPT | Performed by: INTERNAL MEDICINE

## 2025-04-16 RX ORDER — PROPRANOLOL HYDROCHLORIDE 10 MG/1
10 TABLET ORAL 2 TIMES DAILY PRN
Qty: 60 TABLET | Refills: 3 | Status: SHIPPED | OUTPATIENT
Start: 2025-04-16

## 2025-04-16 NOTE — PROGRESS NOTES
HPI:  69-year-old female here for follow. She has been checking her blood pressure and pulse at home. She was having intermittent palpitations, general fatigue. She adjusted her diet to less than 1500 mg of salt and I had her wear an event monitor. In general, she is feeling much better but palpitations have not completely resolved.     Impression:   History of hypertension, quite labile, switching to Lisinopril 20 mg twice daily instead of 40 mg once daily.   Palpitations with short runs of atrial tachycardia, not more than 18 beats. I talked about AV blocking agents and continued close monitoring today.   History rate-dependent left bundle branch block.   Thyroid disorder, on replacement.   Dyslipidemia, on statin.   Echocardiogram April of 2020 remarkable.   Nuclear stress test June 2022, remarkable.   History CT calcium score a year ago of 0.     Plan:  I reviewed the short runs of atrial tachycardia which seemed to be doing better with salt restriction and her blood pressure is much better controlled on Lisinopril 20 mg twice daily. She has a known left bundle branch block. I offered her low-dose Propranolol to take versus scheduled dose. She would like to minimize overall medications and she will therefore take it as-needed and I can see back in 6 months. I also talked about using a home EKG monitor.       Wt Readings from Last 3 Encounters:   04/16/25 77.1 kg (170 lb)   03/06/25 77.7 kg (171 lb 6.4 oz)   10/30/24 77.1 kg (170 lb)     Past Medical History:   Diagnosis Date    Essential hypertension     Hyperlipidemia     Menopause     Thyroid disease        Current Outpatient Medications   Medication Sig Dispense Refill    lisinopril (PRINIVIL;ZESTRIL) 20 MG tablet Take 1 tablet by mouth in the morning and 1 tablet in the evening. 180 tablet 2    levothyroxine (SYNTHROID) 88 MCG tablet Take 1 tablet by mouth every morning (before breakfast)       No current facility-administered medications for this visit.

## 2025-07-14 RX ORDER — PROPRANOLOL HYDROCHLORIDE 10 MG/1
10 TABLET ORAL 2 TIMES DAILY PRN
Qty: 180 TABLET | Refills: 1 | Status: SHIPPED | OUTPATIENT
Start: 2025-07-14